# Patient Record
Sex: MALE | Race: BLACK OR AFRICAN AMERICAN
[De-identification: names, ages, dates, MRNs, and addresses within clinical notes are randomized per-mention and may not be internally consistent; named-entity substitution may affect disease eponyms.]

---

## 2019-12-22 ENCOUNTER — HOSPITAL ENCOUNTER (EMERGENCY)
Dept: HOSPITAL 74 - JER | Age: 39
Discharge: HOME | End: 2019-12-22
Payer: COMMERCIAL

## 2019-12-22 VITALS — DIASTOLIC BLOOD PRESSURE: 91 MMHG | HEART RATE: 94 BPM | SYSTOLIC BLOOD PRESSURE: 132 MMHG

## 2019-12-22 VITALS — BODY MASS INDEX: 1.5 KG/M2

## 2019-12-22 VITALS — TEMPERATURE: 97.9 F

## 2019-12-22 DIAGNOSIS — H53.8: Primary | ICD-10-CM

## 2019-12-22 DIAGNOSIS — F41.9: ICD-10-CM

## 2019-12-22 DIAGNOSIS — R00.0: ICD-10-CM

## 2019-12-22 NOTE — PDOC
History of Present Illness





- General


Chief Complaint: Eye Problem


Stated Complaint: EYE IRRITATION


Time Seen by Provider: 12/22/19 18:45


History Source: Patient


Exam Limitations: No Limitations





- History of Present Illness


Initial Comments: 





40 y/o M, no significant pmh, presents today with blurry vision and irritated 

eyes of 3 day duration that spontaneously began and has since worsened, 

accompanied by tachycardia. Pt states that he noticed that things that are far 

away are difficult to see and at times he gets discomfort in the eyes. He 

states that has been around children lately. He did not use any eye drops or 

anything to relieve his symptoms. Pt also reports when he arrived in the ED, he 

become very tensed and anxious. Denies any trauma to the eyes, abrasion, 

scratching, eye discharge, eye redness, f/c/n/v/d/sob, rhinorrhea.





12/22/19 19:25





12/22/19 19:36





Associated Symptoms: reports: denies symptoms.  denies: chest pain, cough, 

diaphoresis, fever/chills, loss of appetite, nausea/vomiting, rash, shortness 

of breath





Past History





- Travel


Traveled outside of the country in the last 30 days: No


Close contact w/someone who was outside of country & ill: No





- Past Medical History


Allergies/Adverse Reactions: 


 Allergies











Allergy/AdvReac Type Severity Reaction Status Date / Time


 


No Known Allergies Allergy   Verified 12/22/19 18:01











Home Medications: 


Ambulatory Orders





NK [No Known Home Medication]  05/10/15 








CVA: No


COPD: No


CHF: No


DVT: No





- Immunization History


Immunization Up to Date: Yes





- Psycho Social/Smoking Cessation Hx


Smoking History: Never smoked


Information on smoking cessation initiated: No


Hx Alcohol Use: No


Drug/Substance Use Hx: No


Substance Use Type: None





**Review of Systems





- Review of Systems


Able to Perform ROS?: Yes


Is the patient limited English proficient: No


Constitutional: Yes: Symptoms Reported, Weight Stable.  No: Diaphoresis, Fever


HEENTM: Yes: Symptoms Reported, Blurred Vision, Tearing, Recent change in 

vision.  No: Eye Pain, Double Vision, Ear Pain, Ear Discharge, Nose Pain


Respiratory: Yes: Symptoms reported.  No: Cough, Shortness of Breath


Cardiac (ROS): Yes: Symptoms Reported.  No: Chest Pain


ABD/GI: Yes: Symptoms Reported.  No: Diarrhea, Nausea, Vomiting


Neurological: Yes: Symptoms reported.  No: Headache


All Other Systems: Reviewed and Negative





*Physical Exam





- Vital Signs


 Last Vital Signs











Temp Pulse Resp BP Pulse Ox


 


 97.9 F   142 H  22 H  120/87   94 L


 


 12/22/19 18:01  12/22/19 18:01  12/22/19 18:01  12/22/19 18:01  12/22/19 18:01














- Physical Exam


General Appearance: Yes: Nourished, Appropriately Dressed


HEENT: positive: EOMI, RENNY, Normal ENT Inspection, Normal Voice, Pharynx Normal

, Photophobia.  negative: Pale Conjunctivae, Scleral Icterus (R), Scleral 

Icterus (L), Tonsillar Exudate, Rhinorrhea


Neck: positive: Trachea midline, Normal Thyroid, Supple


Respiratory/Chest: positive: Lungs Clear, Normal Breath Sounds


Cardiovascular: positive: Regular Rhythm, Regular Rate, S1, S2.  negative: 

Murmur, Gallop/S3, Gallop/S4


Vascular Pulses: Dorsalis-Pedis (R): 2+, Doralis-Pedis (L): 2+


Gastrointestinal/Abdominal: positive: Normal Bowel Sounds, Soft


Neurologic: positive: CNs II-XII NML intact, Fully Oriented, Alert, Normal Mood/

Affect





ED Treatment Course





- ADDITIONAL ORDERS


Additional order review: 


 Laboratory  Results











  12/22/19





  19:14


 


POC Glucometer  104








 











  12/22/19





  19:14


 


POC Glucometer  104














Medical Decision Making





- Medical Decision Making





40 y/o M, no significant pmh, presents today with blurry vision and irritated 

eyes of 3 day duration that spontaneously began and has since worsened, 

accompanied by tacycardia to the 130s 





#Tachycardia now resolved


likely 2/2 to anxiety


pt reports anxiety when vision changes occurred, also states light bothered him


appears to be anxious


Finger stick glucose normal


r/p HR is 104





#Blurry vision/irritation


likely 2/2 to corneal abrasion


Flourescin test negative- no foreign body or abrasion seen


Tetracaine applied


advised pt to f/u with eye doctor


will sent him with eye drops








12/22/19 19:33





12/22/19 19:36





12/22/19 19:59








Discharge





- Discharge Information


Problems reviewed: Yes


Clinical Impression/Diagnosis: 


 Tachycardia, Anxiety, Eye irritation





Condition: Improved


Disposition: HOME





- Admission


No





- Follow up/Referral


Referrals: 


Avila Olvera [Primary Care Provider] - 


Sergio Farrell MD [Staff Physician] - 





- Patient Discharge Instructions


Patient Printed Discharge Instructions:  How to Instill Eye Drops


Additional Instructions: 


You were seen in the Emergency department for Eye irritation and blurry vision


While you were in the emergency room, we evaluated you with an EKG and eye 

tests. We found that your eye irritation may have been caused by scratching 

your eyes. We applied medications and eye drops to your eyes.





We also found that while in the emergency room, your heart rate was very high 

which was likely from anxiety. However, your heart rate returned to normal by 

itself.





To complete the treatment of your eye irritation, please use your eye drops as 

instructed





Apply Tobramycin eye drops once a day to your eyes until your symptoms have 

resolved





Please use all your medications as prescribed


Please follow up with your eye doctor within 1 week


Return to the emergency room, if you experience any worsening of your symptoms, 

eye discharge, eye pain, fevers, chills or any other worsening of your 

condition. 





- Post Discharge Activity

## 2019-12-23 NOTE — PDOC
Documentation entered by Garret Baugh SCRIBE, acting as scribe for Esther Vidal MD.








Esther Vidal MD:  This documentation has been prepared by the Amauri clemente Daniel, SCRIBE, under my direction and personally reviewed by me in its 

entirety.  I confirm that the documentation accurately reflects all work, 

treatment, procedures, and medical decision making performed by me.  





Attending Attestation





- Resident


Resident Name: Roosevelt Vidal





- ED Attending Attestation


I have performed the following: I have examined & evaluated the patient, The 

case was reviewed & discussed with the resident, I agree w/resident's findings 

& plan, Exceptions are as noted





- HPI


HPI: 





12/22/19 20:10


The patient is a 39 year old male with no past medical history here today for 

evaluation of blurry vision and eye irritation. The patient reports that he 

developed eye irritation and blurry vision 3 days ago. He describes his blurry 

vision as being unable to see things far away. He reports feeling anxious upon 

arrival to the ER and was noted to be tachycardic.


 


Patient denies headache, lightheadedness. Denies fever, chills. Denies chest 

pain, shortness of breath. Denies nausea, vomiting, diarrhea, abdominal pain. 





Allergies: NKA


PCP: Avila Olvera








- Physicial Exam


PE: 





12/23/19 02:57


40 yo male has irritated ,itchy eyes 


head ncat


eyes manuel   eomi 


neck supple


lungs cta b/l


cvs tachycardia


and nontender


neuro axox3,ambulating with ease





- Medical Decision Making





12/23/19 03:03


visual acuity  20/20, OU can easily read printed material, no diplopia


Fluorescein dye placed OU, no corneal abrasion seen





pt initially was tachycardic but denies any shortness of breath or chest pain. 

He id he felt anxious and his heart rate came down to 107  without intervention


ekg showed sinus 





pt referred to his ophthalmologist at [facility] for follow up

## 2019-12-27 NOTE — EKG
Test Reason : 

Blood Pressure : ***/*** mmHG

Vent. Rate : 134 BPM     Atrial Rate : 134 BPM

   P-R Int : 144 ms          QRS Dur : 070 ms

    QT Int : 282 ms       P-R-T Axes : 057 -04 043 degrees

   QTc Int : 421 ms

 

SINUS TACHYCARDIA

POSSIBLE LEFT ATRIAL ENLARGEMENT

NO PREVIOUS ECGS AVAILABLE

Confirmed by PATRICK BELL MD ([id number]) on 12/27/2019 2:12:04 PM

 

Referred By:             Confirmed By:PATRICK BELL MD

## 2022-01-07 ENCOUNTER — HOSPITAL ENCOUNTER (EMERGENCY)
Dept: HOSPITAL 74 - JER | Age: 42
Discharge: HOME | End: 2022-01-07
Payer: COMMERCIAL

## 2022-01-07 VITALS — SYSTOLIC BLOOD PRESSURE: 133 MMHG | HEART RATE: 102 BPM | DIASTOLIC BLOOD PRESSURE: 86 MMHG

## 2022-01-07 VITALS — BODY MASS INDEX: 25.8 KG/M2

## 2022-01-07 VITALS — TEMPERATURE: 98.5 F

## 2022-01-07 DIAGNOSIS — N17.9: Primary | ICD-10-CM

## 2022-01-07 DIAGNOSIS — N21.8: ICD-10-CM

## 2022-01-07 LAB
ALBUMIN SERPL-MCNC: 4.3 G/DL (ref 3.4–5)
ALBUMIN SERPL-MCNC: 4.6 G/DL (ref 3.4–5)
ALP SERPL-CCNC: 61 U/L (ref 45–117)
ALP SERPL-CCNC: 65 U/L (ref 45–117)
ALT SERPL-CCNC: 45 U/L (ref 13–61)
ALT SERPL-CCNC: 53 U/L (ref 13–61)
ANION GAP SERPL CALC-SCNC: 10 MMOL/L (ref 8–16)
ANION GAP SERPL CALC-SCNC: 9 MMOL/L (ref 8–16)
ANISOCYTOSIS BLD QL: 0
APPEARANCE UR: (no result)
AST SERPL-CCNC: 18 U/L (ref 15–37)
AST SERPL-CCNC: 61 U/L (ref 15–37)
BACTERIA # UR AUTO: 17 /UL (ref 0–1359)
BILIRUB SERPL-MCNC: 1.3 MG/DL (ref 0.2–1)
BILIRUB SERPL-MCNC: 1.4 MG/DL (ref 0.2–1)
BILIRUB UR STRIP.AUTO-MCNC: NEGATIVE MG/DL
BUN SERPL-MCNC: 19.8 MG/DL (ref 7–18)
BUN SERPL-MCNC: 19.9 MG/DL (ref 7–18)
CALCIUM SERPL-MCNC: 10.3 MG/DL (ref 8.5–10.1)
CALCIUM SERPL-MCNC: 9.8 MG/DL (ref 8.5–10.1)
CASTS URNS QL MICRO: 0 /UL (ref 0–3.1)
CHLORIDE SERPL-SCNC: 102 MMOL/L (ref 98–107)
CHLORIDE SERPL-SCNC: 103 MMOL/L (ref 98–107)
CO2 SERPL-SCNC: 25 MMOL/L (ref 21–32)
CO2 SERPL-SCNC: 27 MMOL/L (ref 21–32)
COLOR UR: YELLOW
CREAT SERPL-MCNC: 1.7 MG/DL (ref 0.55–1.3)
CREAT SERPL-MCNC: 1.7 MG/DL (ref 0.55–1.3)
DEPRECATED RDW RBC AUTO: 12.7 % (ref 11.9–15.9)
EPITH CASTS URNS QL MICRO: 12 /UL (ref 0–25.1)
GLUCOSE SERPL-MCNC: 89 MG/DL (ref 74–106)
GLUCOSE SERPL-MCNC: 99 MG/DL (ref 74–106)
HCT VFR BLD CALC: 45.6 % (ref 35.4–49)
HGB BLD-MCNC: 15.2 GM/DL (ref 11.7–16.9)
KETONES UR QL STRIP: (no result)
LEUKOCYTE ESTERASE UR QL STRIP.AUTO: (no result)
MACROCYTES BLD QL: 0
MCH RBC QN AUTO: 33.3 PG (ref 25.7–33.7)
MCHC RBC AUTO-ENTMCNC: 33.4 G/DL (ref 32–35.9)
MCV RBC: 99.7 FL (ref 80–96)
NITRITE UR QL STRIP: NEGATIVE
PH UR: >= 9 [PH] (ref 5–8)
PLATELET # BLD AUTO: 276 10^3/UL (ref 134–434)
PLATELET BLD QL SMEAR: NORMAL
PMV BLD: 8 FL (ref 7.5–11.1)
PROT SERPL-MCNC: 7.8 G/DL (ref 6.4–8.2)
PROT SERPL-MCNC: 9.3 G/DL (ref 6.4–8.2)
PROT UR QL STRIP: (no result)
PROT UR QL STRIP: NEGATIVE
RBC # BLD AUTO: 2400 /UL (ref 0–23.9)
RBC # BLD AUTO: 4.57 M/MM3 (ref 4–5.6)
SODIUM SERPL-SCNC: 137 MMOL/L (ref 136–145)
SODIUM SERPL-SCNC: 139 MMOL/L (ref 136–145)
SP GR UR: 1.02 (ref 1.01–1.03)
UROBILINOGEN UR STRIP-MCNC: 1 MG/DL (ref 0.2–1)
WBC # BLD AUTO: 9.3 K/MM3 (ref 4–10)
WBC # UR AUTO: 4 /UL (ref 0–25.8)

## 2023-04-13 ENCOUNTER — HOSPITAL ENCOUNTER (EMERGENCY)
Dept: HOSPITAL 74 - JERFT | Age: 43
Discharge: HOME | End: 2023-04-13
Payer: COMMERCIAL

## 2023-04-13 VITALS
RESPIRATION RATE: 19 BRPM | HEART RATE: 98 BPM | SYSTOLIC BLOOD PRESSURE: 133 MMHG | TEMPERATURE: 99.1 F | DIASTOLIC BLOOD PRESSURE: 78 MMHG

## 2023-04-13 VITALS — BODY MASS INDEX: 25 KG/M2

## 2023-04-13 DIAGNOSIS — R07.0: ICD-10-CM

## 2023-04-13 DIAGNOSIS — R05.9: ICD-10-CM

## 2023-04-13 DIAGNOSIS — J06.9: Primary | ICD-10-CM

## 2023-04-13 DIAGNOSIS — Z20.822: ICD-10-CM

## 2023-04-13 DIAGNOSIS — R50.9: ICD-10-CM
